# Patient Record
Sex: FEMALE | Race: WHITE | NOT HISPANIC OR LATINO | Employment: UNEMPLOYED | URBAN - NONMETROPOLITAN AREA
[De-identification: names, ages, dates, MRNs, and addresses within clinical notes are randomized per-mention and may not be internally consistent; named-entity substitution may affect disease eponyms.]

---

## 2021-02-08 ENCOUNTER — HOSPITAL ENCOUNTER (EMERGENCY)
Facility: HOSPITAL | Age: 61
Discharge: HOME | End: 2021-02-08
Attending: EMERGENCY MEDICINE | Admitting: EMERGENCY MEDICINE
Payer: COMMERCIAL

## 2021-02-08 VITALS
SYSTOLIC BLOOD PRESSURE: 182 MMHG | RESPIRATION RATE: 22 BRPM | DIASTOLIC BLOOD PRESSURE: 91 MMHG | OXYGEN SATURATION: 96 % | TEMPERATURE: 99.5 F | HEART RATE: 118 BPM

## 2021-02-08 DIAGNOSIS — T14.8XXA SKIN ABRASION: Primary | ICD-10-CM

## 2021-02-08 PROCEDURE — 99282 EMERGENCY DEPT VISIT SF MDM: CPT | Performed by: EMERGENCY MEDICINE

## 2021-02-08 PROCEDURE — 99282 EMERGENCY DEPT VISIT SF MDM: CPT

## 2021-02-09 NOTE — ED PROVIDER NOTES
History  Chief Complaint   Patient presents with    Laceration     Pt was shaving her legs and cut her vein while in the shower  Patient cut the skin of her left medial ankle while shaving  She cut an area where there are some varicose veins  She had some bleeding  She applied a pressure dressing and came to the emergency room  She does not have any other complaints  The onset was approximately 1 hour ago  History provided by:  Patient   used: No    Laceration  Location:  Leg  Leg laceration location:  L ankle  Length:  Punctate  Depth:  Cutaneous  Quality comment:  Superficial abrasion, no laceration  Bleeding: venous    Time since incident:  1 hour  Injury mechanism: Razor  Pain details:     Severity:  No pain    Timing:  Constant    Progression:  Unchanged  Relieved by:  Nothing  Worsened by:  Nothing  Associated symptoms: no fever and no rash        None       Past Medical History:   Diagnosis Date    Glaucoma, bilateral        History reviewed  No pertinent surgical history  History reviewed  No pertinent family history  I have reviewed and agree with the history as documented  E-Cigarette/Vaping    E-Cigarette Use Never User      E-Cigarette/Vaping Substances     Social History     Tobacco Use    Smoking status: Never Smoker    Smokeless tobacco: Never Used   Substance Use Topics    Alcohol use: Yes     Frequency: Monthly or less    Drug use: Never       Review of Systems   Constitutional: Negative for chills and fever  HENT: Negative for ear pain, hearing loss, sore throat, trouble swallowing and voice change  Eyes: Negative for pain and discharge  Respiratory: Negative for cough, shortness of breath and wheezing  Cardiovascular: Negative for chest pain and palpitations  Gastrointestinal: Negative for abdominal pain, blood in stool, constipation, diarrhea, nausea and vomiting     Genitourinary: Negative for dysuria, flank pain, frequency and hematuria  Musculoskeletal: Negative for joint swelling, neck pain and neck stiffness  Skin: Negative for rash and wound  Neurological: Negative for dizziness, seizures, syncope, facial asymmetry and headaches  Psychiatric/Behavioral: Negative for hallucinations, self-injury and suicidal ideas  All other systems reviewed and are negative  Physical Exam  Physical Exam  Vitals signs and nursing note reviewed  Constitutional:       General: She is not in acute distress  Appearance: She is well-developed  HENT:      Head: Normocephalic and atraumatic  Right Ear: External ear normal       Left Ear: External ear normal    Eyes:      General:         Right eye: No discharge  Left eye: No discharge  Extraocular Movements: Extraocular movements intact  Conjunctiva/sclera: Conjunctivae normal    Neck:      Musculoskeletal: Normal range of motion and neck supple  Pulmonary:      Effort: Pulmonary effort is normal  No respiratory distress  Musculoskeletal: Normal range of motion  General: No deformity  Skin:     Comments: Punctate and superficial abrasion overlying the medial aspect of the left ankle  Minimal venous ooze  No arterial bleeding  Distal neurovascular function is normal    Neurological:      Mental Status: She is alert and oriented to person, place, and time     Psychiatric:         Behavior: Behavior normal          Vital Signs  ED Triage Vitals [02/08/21 1936]   Temperature Pulse Respirations Blood Pressure SpO2   99 5 °F (37 5 °C) (!) 140 22 (!) 182/91 94 %      Temp Source Heart Rate Source Patient Position - Orthostatic VS BP Location FiO2 (%)   Temporal Monitor Lying Right arm --      Pain Score       1           Vitals:    02/08/21 1936   BP: (!) 182/91   Pulse: (!) 140   Patient Position - Orthostatic VS: Lying         Visual Acuity      ED Medications  Medications - No data to display    Diagnostic Studies  Results Reviewed     None No orders to display              Procedures  General Procedure    Date/Time: 2/8/2021 7:44 PM  Performed by: Hemant Baptiste MD  Authorized by: Hemant Baptiste MD     Procedure Detail:     Procedure note (site, laterality, method, findings): Wound care procedure performed by me    Area examined and noted to have small punctate superficial abrasion not extending throughout the entire dermal layer with minimal venous ooze  Direct pressure was applied and hemostasis was achieved  Following this, Dermabond was applied to the area  Following this, pressure dressing was applied to the area  No further bleeding was noted  Post-procedure details:     Patient tolerance of procedure: Tolerated well, no immediate complications             ED Course                                           MDM    Disposition  Final diagnoses:   Skin abrasion     Time reflects when diagnosis was documented in both MDM as applicable and the Disposition within this note     Time User Action Codes Description Comment    2/8/2021  7:56 PM John Felix Add Shade Au  8XXA] Skin abrasion       ED Disposition     ED Disposition Condition Date/Time Comment    Discharge Stable Mon Feb 8, 2021  7:56 PM Manolo Samayoa discharge to home/self care  Follow-up Information     Follow up With Specialties Details Why Contact Info    Your primary care physician   As needed           Patient's Medications    No medications on file     No discharge procedures on file      PDMP Review     None          ED Provider  Electronically Signed by           Hemant Baptiste MD  02/08/21 2206